# Patient Record
Sex: MALE | Race: WHITE
[De-identification: names, ages, dates, MRNs, and addresses within clinical notes are randomized per-mention and may not be internally consistent; named-entity substitution may affect disease eponyms.]

---

## 2018-05-13 ENCOUNTER — HOSPITAL ENCOUNTER (EMERGENCY)
Dept: HOSPITAL 17 - NEPC | Age: 38
Discharge: HOME | End: 2018-05-13
Payer: SELF-PAY

## 2018-05-13 VITALS — WEIGHT: 143.3 LBS | HEIGHT: 66 IN | BODY MASS INDEX: 23.03 KG/M2

## 2018-05-13 VITALS
HEART RATE: 81 BPM | DIASTOLIC BLOOD PRESSURE: 88 MMHG | SYSTOLIC BLOOD PRESSURE: 152 MMHG | TEMPERATURE: 99 F | RESPIRATION RATE: 18 BRPM | OXYGEN SATURATION: 99 %

## 2018-05-13 DIAGNOSIS — Z88.1: ICD-10-CM

## 2018-05-13 DIAGNOSIS — Z72.0: ICD-10-CM

## 2018-05-13 DIAGNOSIS — Z88.0: ICD-10-CM

## 2018-05-13 DIAGNOSIS — Z88.2: ICD-10-CM

## 2018-05-13 DIAGNOSIS — L03.119: Primary | ICD-10-CM

## 2018-05-13 PROCEDURE — 10061 I&D ABSCESS COMP/MULTIPLE: CPT

## 2018-05-13 NOTE — PD
HPI


Chief Complaint:  Skin Problem


Time Seen by Provider:  22:02


Travel History


International Travel<30 days:  No


Contact w/Intl Traveler<30days:  No


Traveled to known affect area:  No





History of Present Illness


HPI


The patient is 29 years old and has had swelling in the right leg associated 

with pain and redness.  He believes it might have been a spider bite or insect 

bite having led to an abscess.  He reports chills.  No fever.  Duration 3 days.

  Similar process occurred many years ago involving the face.  Timing constant.

  Onset gradual.





PFSH


Past Medical History


Medical History:  Denies Significant Hx


Diminished Hearing:  Yes (deaf right ear)


Tetanus Vaccination:  < 5 Years


Influenza Vaccination:  No





Past Surgical History


Eye Surgery:  Yes


Other Surgery:  Yes (right carpal tunnel)





Social History


Alcohol Use:  No


Tobacco Use:  Yes


Substance Use:  No





Allergies-Medications


(Allergen,Severity, Reaction):  


Coded Allergies:  


     Penicillins (Verified  Allergy, Severe, 5/13/18)


 as a child


     Sulfa (Sulfonamide Antibiotics) (Verified  Allergy, Severe, 5/13/18)


 as a child


     amoxicillin (Verified  Allergy, Severe, 5/13/18)


 as a child


     clavulanic acid (Verified  Allergy, Severe, 5/13/18)


 as a child


     erythromycin base (Verified  Allergy, Severe, 5/13/18)


 as a child





Review of Systems


Except as stated in HPI:  all other systems reviewed are Neg


General / Constitutional:  No: Fever





Physical Exam


Narrative


GENERAL: Well-nourished well-developed 37-year-old male


SKIN: Warm and dry.  On the medial aspect of the distal left thigh there is a 4 

cm area of induration with minimal central fluctuance concerning for abscess.  

More medially and about the same area there is a 2 cm cellulitic process with 

trace fluctuant.


HEAD: Atraumatic. Normocephalic. 


EYES: Pupils equal and round. No scleral icterus. No injection or drainage. 


ENT: No nasal bleeding or discharge.  Mucous membranes pink and moist.


NECK: Trachea midline. No JVD. 


CARDIOVASCULAR: Regular rate and rhythm.





Data


Data


Last Documented VS





Vital Signs








  Date Time  Temp Pulse Resp B/P (MAP) Pulse Ox O2 Delivery O2 Flow Rate FiO2


 


5/13/18 21:45 99.0 81 18 152/88 (109) 99   








Orders





 Orders


Lidocai-Epi 1%-1:100,000 Inj (Xylocaine- (5/13/18 22:15)


Clindamycin (Cleocin) (5/13/18 22:15)








MDM


Medical Decision Making


Medical Screen Exam Complete:  Yes


Emergency Medical Condition:  Yes


Medical Record Reviewed:  Yes


Differential Diagnosis


Cellulitis, abscess, synovial cyst


Narrative Course


Abscesses drained.  The more medial and larger abscess drained almost no pus 

raising concern for a severe cellulitis.  Clindamycin prescription and return 

precautions were discussed in detail.  Patient verbalized understanding.





Procedures


**Procedure Narrative**


After the risks and benefits were discussed the following procedure was 

performed:


INCISION AND DRAINAGE OF ABSCESS: The area was prepped and was sterilely 

draped.  A subcutaneous wheal of1 % Xylocaine  with a total number 2mL was used 

to anesthetize the area.  The area was properly anesthetized.  A 

[id number] was used to make a 1 cm incision across the area of the 

abscess.  Cultures were obtained.  The abscess was drained an irrigated with 

normal saline.  Quarter inch iodoform packing  was placed in the wound. Sterile 

dressing applied. Patient advised to have packing removed in two days.








After the risks and benefits were discussed the following procedure was 

performed:


INCISION AND DRAINAGE OF ABSCESS: The area was prepped and was sterilely 

draped.  A subcutaneous wheal of [1% Xylocaine  with a total number1 mL was 

used to anesthetize the area.  The area was properly anesthetized.  A number 11 

scalpel was used to make a 1 -cm incision across the area of the abscess.  

Cultures were obtained.  The abscess was drained an irrigated with normal 

saline.  Quarter inch iodoform packing  was placed in the wound. Sterile 

dressing applied. Patient advised to have packing removed in two days.





Diagnosis





 Primary Impression:  


 Cellulitis and abscess of lower extremity


Referrals:  


Primary Care Physician


call for appointment


***Med/Other Pt SpecificInfo:  Prescription(s) given


Scripts


Clindamycin (Clindamycin) 150 Mg Cap


450 MG PO Q8H for Infection for 10 Days, #90 CAP 0 Refills


   Prov: Ilya Melgoza MD         5/13/18


Disposition:  01 DISCHARGE HOME


Condition:  Stable











Ilya Melgoza MD May 13, 2018 22:26

## 2023-07-04 ENCOUNTER — APPOINTMENT (OUTPATIENT)
Dept: GENERAL RADIOLOGY | Age: 43
End: 2023-07-04
Payer: COMMERCIAL

## 2023-07-04 ENCOUNTER — HOSPITAL ENCOUNTER (EMERGENCY)
Age: 43
Discharge: HOME | End: 2023-07-05
Attending: STUDENT IN AN ORGANIZED HEALTH CARE EDUCATION/TRAINING PROGRAM
Payer: COMMERCIAL

## 2023-07-04 VITALS
BODY MASS INDEX: 21.69 KG/M2 | HEART RATE: 81 BPM | DIASTOLIC BLOOD PRESSURE: 92 MMHG | TEMPERATURE: 98.7 F | HEIGHT: 66 IN | RESPIRATION RATE: 16 BRPM | SYSTOLIC BLOOD PRESSURE: 145 MMHG | WEIGHT: 135 LBS | OXYGEN SATURATION: 97 %

## 2023-07-04 DIAGNOSIS — S90.31XA CONTUSION OF RIGHT FOOT, INITIAL ENCOUNTER: ICD-10-CM

## 2023-07-04 DIAGNOSIS — S89.91XA INJURY OF RIGHT KNEE, INITIAL ENCOUNTER: Primary | ICD-10-CM

## 2023-07-04 PROCEDURE — 99284 EMERGENCY DEPT VISIT MOD MDM: CPT

## 2023-07-04 PROCEDURE — 73562 X-RAY EXAM OF KNEE 3: CPT

## 2023-07-04 PROCEDURE — 96372 THER/PROPH/DIAG INJ SC/IM: CPT

## 2023-07-04 PROCEDURE — 73630 X-RAY EXAM OF FOOT: CPT

## 2023-07-04 RX ORDER — KETOROLAC TROMETHAMINE 15 MG/ML
15 INJECTION, SOLUTION INTRAMUSCULAR; INTRAVENOUS ONCE
Status: COMPLETED | OUTPATIENT
Start: 2023-07-04 | End: 2023-07-05

## 2023-07-04 ASSESSMENT — PAIN - FUNCTIONAL ASSESSMENT: PAIN_FUNCTIONAL_ASSESSMENT: 0-10

## 2023-07-04 ASSESSMENT — PAIN SCALES - GENERAL: PAINLEVEL_OUTOF10: 10

## 2023-07-04 ASSESSMENT — LIFESTYLE VARIABLES
HOW OFTEN DO YOU HAVE A DRINK CONTAINING ALCOHOL: NEVER
HOW MANY STANDARD DRINKS CONTAINING ALCOHOL DO YOU HAVE ON A TYPICAL DAY: PATIENT DOES NOT DRINK

## 2023-07-05 ENCOUNTER — OFFICE VISIT (OUTPATIENT)
Dept: ORTHOPEDIC SURGERY | Age: 43
End: 2023-07-05
Payer: COMMERCIAL

## 2023-07-05 DIAGNOSIS — S83.511A RUPTURE OF ANTERIOR CRUCIATE LIGAMENT OF RIGHT KNEE, INITIAL ENCOUNTER: ICD-10-CM

## 2023-07-05 DIAGNOSIS — M25.561 RIGHT KNEE PAIN, UNSPECIFIED CHRONICITY: Primary | ICD-10-CM

## 2023-07-05 PROCEDURE — 6360000002 HC RX W HCPCS: Performed by: STUDENT IN AN ORGANIZED HEALTH CARE EDUCATION/TRAINING PROGRAM

## 2023-07-05 PROCEDURE — 99204 OFFICE O/P NEW MOD 45 MIN: CPT | Performed by: ORTHOPAEDIC SURGERY

## 2023-07-05 RX ORDER — NAPROXEN 500 MG/1
500 TABLET ORAL 2 TIMES DAILY WITH MEALS
Qty: 30 TABLET | Refills: 0 | Status: SHIPPED | OUTPATIENT
Start: 2023-07-05

## 2023-07-05 RX ADMIN — KETOROLAC TROMETHAMINE 15 MG: 15 INJECTION, SOLUTION INTRAMUSCULAR; INTRAVENOUS at 00:42

## 2023-07-05 ASSESSMENT — PAIN SCALES - GENERAL: PAINLEVEL_OUTOF10: 8

## 2023-07-05 NOTE — PROGRESS NOTES
Name: Norma Fry  YOB: 1980  Gender: male  MRN: [id number]    CC:   Chief Complaint   Patient presents with    Knee Pain     Right knee   , Right activity related knee pain, swelling and instability  Date of injury 7-3-2023  HPI: This patient presents with an acute history of Right knee pain. Patient states that he was doing a river tubing activity when his knee slipped when trying to jump off the rock and then he hit more shallow water which caused a valgus stress. Patient states it feels like his knee wants to hyperextend. The pain interferes with activities of daily living. There is a feeling of instability. There is swelling and stiffness. Patient went to the emergency department and presents today nonweightbearing with crutches and immobilizer. Denies numbness and tingling. No prior knee issues before event. Allergies   Allergen Reactions    Penicillins      Pt states he was young and got ill and lethargic. History reviewed. No pertinent past medical history. History reviewed. No pertinent surgical history. History reviewed. No pertinent family history.   Social History     Socioeconomic History    Marital status: Single     Spouse name: Not on file    Number of children: Not on file    Years of education: Not on file    Highest education level: Not on file   Occupational History    Not on file   Tobacco Use    Smoking status: Every Day     Packs/day: 1.00     Types: Cigarettes    Smokeless tobacco: Never   Substance and Sexual Activity    Alcohol use: Not on file    Drug use: Not on file    Sexual activity: Not on file   Other Topics Concern    Not on file   Social History Narrative    Not on file     Social Determinants of Health     Financial Resource Strain: Not on file   Food Insecurity: Not on file   Transportation Needs: Not on file   Physical Activity: Not on file   Stress: Not on file   Social Connections: Not on file   Intimate Partner Violence: Not on file

## 2023-07-05 NOTE — PATIENT INSTRUCTIONS
that typically result in full restoration of extension and good quadriceps activation within a few minutes. These exercises are therefore a quick and easy solution to what has traditionally been considered a difficult problem in some patients. Regaining full symmetrical extension is a primary goal of early-phase rehabilitation after ACL reconstruction. Failure to regain full extension by 3 weeks after ACL reconstruction is an important predictive factor for subsequent cyclops syndrome. 2 We believe that this technique has transformed our practice. We have previously reported a posteACL reconstruction cyclops rate of 2.1%, which was noted to reduce to 0.1% after we introduced this technique. 2     After knee injury, it is important not to confuse AMI with a locked knee. It should be noted that a magnetic resonance imagingeproven displaced bucket-handle tear does not definitively show that the extension deficit is due to a mechanical block to extension. Irvin and Lonza Olszewski observed that 10% of patients with a displaced bucket-handle tear of the meniscus did not present with any locking of the knee. In addition, Tacho Lee reported that among patients presenting with a knee extension deficit after injury, 92% had intra-articular pathology but only 16% of the knees remained locked after induction of anesthesia. They concluded that the knee extension deficit present in most patients was due to hamstring muscle spasms. Since the 1990s, studies have shown that hamstring contracture was associated with quadriceps inactivation and that this occurred because of a process known as AMI. 3 Orthopaedic surgeons deal with these types of clinical presentations on a daily basis. However, AMI is not a well-established orthopaedic concept because most articles on this subject have been published in non-orthopaedic journals.     The underlying mechanism of AMI is not fully understood but is believed to be initiated by

## 2023-07-05 NOTE — ED TRIAGE NOTES
Pt reports being with some friends tubing today. Pt reports jumping off of a tree into water and landed too close to the bank. Pt states he hit his foot on a rock when he landed and his whole leg from the knee down twisted outward. Pt states he cannot put pressure onto his right leg and cannot bend it without pain. No obvious deformity.

## 2023-07-05 NOTE — ED PROVIDER NOTES
[address]  Emergency Department    DISPOSITION Decision To Discharge 07/05/2023 01:03:43 AM       ICD-10-CM    1. Injury of right knee, initial encounter  S89. 91XA [facility]      2. Contusion of right foot, initial encounter  S90.31XA         ED Course     ED Course as of 07/05/23 0146   Wed Jul 05, 2023   268 43-year-old male presents with right knee and foot injury after jumping on a rock with twisting injury of knee. X-ray imaging negative. Suspect likely ligamentous injury. Placed in knee immobilizer and educated on RICE therapy. Given referral to orthopedic surgery. Stable for discharge home. Return precautions given. [ER]      ED Course User Index  [ER] Rafael Guzmán MD     Complexity of Problems Addressed:  1 or more stable acute illness or injury    Data Reviewed and Analyzed:  Category 1:   I ordered each unique test.  I interpreted the results of each unique test.      Category 2:   ED EKG was independently interpreted in the absence of a cardiologist.  I interpreted the X-rays no obvious fracture. Category 3: Discussion of management or test interpretation. See ED Course above    HPI   Rudy Mora is a 36 y.o. male with a history of none who presents to the ED with complaint of knee pain. Patient states he was tubing down a river today when he jumped off of a rock and landed on a hard rock in the water. Had pain to the medial aspect of his right foot at the base of his great toe. Also noted that his lower leg everted outwards and he had pain in the lateral aspect of his knee. Has noted pain and swelling to his right knee. Denies numbness, tingling, weakness. Pain is made worse with flexion and extension. No medications prior to arrival.  Of note, patient moved here 3 days ago from [place] to be in a drug rehab program.    History   History reviewed. No pertinent past medical history.   History

## 2023-07-05 NOTE — DISCHARGE INSTRUCTIONS
Apply ice to the area for 20 minutes every few hours. Keep the knee elevated when sitting or lying down. You may use the knee immobilizer when walking around. You are okay to bear weight on the area as tolerated. This up with orthopedic surgery clinic in the next few weeks. Give their office a call to set up an appointment.     [address], [address] [facility], [address] [place] [address]  [phone]

## 2023-07-07 ENCOUNTER — TELEPHONE (OUTPATIENT)
Dept: ORTHOPEDIC SURGERY | Age: 43
End: 2023-07-07

## 2023-07-07 NOTE — TELEPHONE ENCOUNTER
Spoke with pt and gave him number for [facility], faxed over order form.  Patient expressed understanding

## 2023-07-07 NOTE — TELEPHONE ENCOUNTER
He was seen the other day and was being referred out for an MRI due to insurance. He states the number he was given was incorrect. Please call.